# Patient Record
Sex: MALE | Race: BLACK OR AFRICAN AMERICAN | Employment: FULL TIME | URBAN - METROPOLITAN AREA
[De-identification: names, ages, dates, MRNs, and addresses within clinical notes are randomized per-mention and may not be internally consistent; named-entity substitution may affect disease eponyms.]

---

## 2017-01-18 ENCOUNTER — OFFICE VISIT (OUTPATIENT)
Dept: FAMILY MEDICINE CLINIC | Age: 52
End: 2017-01-18

## 2017-01-18 VITALS
OXYGEN SATURATION: 95 % | DIASTOLIC BLOOD PRESSURE: 97 MMHG | HEART RATE: 80 BPM | RESPIRATION RATE: 16 BRPM | HEIGHT: 71 IN | TEMPERATURE: 97.3 F | WEIGHT: 210 LBS | BODY MASS INDEX: 29.4 KG/M2 | SYSTOLIC BLOOD PRESSURE: 155 MMHG

## 2017-01-18 DIAGNOSIS — J40 BRONCHITIS: Primary | ICD-10-CM

## 2017-01-18 RX ORDER — DOXYCYCLINE 100 MG/1
100 CAPSULE ORAL 2 TIMES DAILY
Qty: 20 CAP | Refills: 0 | Status: SHIPPED | OUTPATIENT
Start: 2017-01-18 | End: 2017-01-28

## 2017-01-18 RX ORDER — METOPROLOL TARTRATE 50 MG/1
50 TABLET ORAL 2 TIMES DAILY
Qty: 30 TAB | Refills: 11 | Status: SHIPPED | OUTPATIENT
Start: 2017-01-18 | End: 2018-10-02

## 2017-01-18 RX ORDER — DILTIAZEM HYDROCHLORIDE EXTENDED-RELEASE TABLETS 240 MG/1
240 TABLET, EXTENDED RELEASE ORAL DAILY
Qty: 30 TAB | Refills: 0 | Status: SHIPPED | OUTPATIENT
Start: 2017-01-18 | End: 2018-09-14

## 2017-01-18 RX ORDER — TELMISARTAN 40 MG/1
40 TABLET ORAL DAILY
Qty: 30 TAB | Refills: 11 | Status: SHIPPED | OUTPATIENT
Start: 2017-01-18 | End: 2018-09-14

## 2017-01-18 RX ORDER — CLONIDINE HYDROCHLORIDE 0.2 MG/1
0.2 TABLET ORAL
Qty: 90 TAB | Refills: 0 | Status: SHIPPED | OUTPATIENT
Start: 2017-01-18 | End: 2017-02-15 | Stop reason: SDUPTHER

## 2017-01-18 RX ORDER — CLONIDINE HYDROCHLORIDE 0.2 MG/1
TABLET ORAL
COMMUNITY
End: 2017-01-18 | Stop reason: SDUPTHER

## 2017-01-18 NOTE — MR AVS SNAPSHOT
Visit Information Date & Time Provider Department Dept. Phone Encounter #  
 1/18/2017  4:30 PM Maxine Soto, 3 [facility] [phone] Upcoming Health Maintenance Date Due Hepatitis C Screening 1965 EYE EXAM RETINAL OR DILATED Q1 10/14/1974 FOBT Q 1 YEAR AGE 50-75 10/14/2014 MICROALBUMIN Q1 11/19/2015 HEMOGLOBIN A1C Q6M 4/3/2017 Pneumococcal 19-64 Highest Risk (2 of 3 - PCV13) 10/3/2017 FOOT EXAM Q1 10/3/2017 LIPID PANEL Q1 10/3/2017 DTaP/Tdap/Td series (2 - Td) 10/3/2026 Allergies as of 1/18/2017  Review Complete On: 1/18/2017 By: Deyvi Ro LPN Severity Noted Reaction Type Reactions Lisinopril  10/03/2016    Swelling Pt states any medications ending in \"Pril\" he's allergic to Current Immunizations  Never Reviewed Name Date Influenza Vaccine 10/3/2016 Pneumococcal Polysaccharide (PPSV-23) 10/3/2016 Tdap 10/3/2016 Not reviewed this visit You Were Diagnosed With   
  
 Codes Comments Bronchitis    -  Primary ICD-10-CM: R33 ICD-9-CM: 017 DM (diabetes mellitus), secondary, uncontrolled, with renal complications ([facility]     AKR-87-YH: E13.29, E13.65 ICD-9-CM: 249.41 Vitals BP Pulse Temp Resp Height(growth percentile) Weight(growth percentile) (!) 155/97 (BP 1 Location: Left arm) 80 97.3 °F (36.3 °C) (Oral) 16 5' 11\" (1.803 m) 210 lb (95.3 kg) SpO2 BMI Smoking Status 95% 29.29 kg/m2 Never Smoker BMI and BSA Data Body Mass Index Body Surface Area  
 29.29 kg/m 2 2.18 m 2 Preferred Pharmacy Pharmacy Name Phone [facility] [address] [phone] Your Updated Medication List  
  
   
This list is accurate as of: 1/18/17  4:42 PM.  Always use your most recent med list.  
  
  
  
  
 atorvastatin 40 mg tablet Commonly known as:  LIPITOR Take 1 Tab by mouth daily. cloNIDine HCl 0.2 mg tablet Commonly known as:  CATAPRES Take 1 Tab by mouth Before breakfast, lunch, and dinner. diltiazem hcl 240 mg Tb24 Take 240 mg by mouth daily. doxycycline 100 mg capsule Commonly known as:  VIBRAMYCIN Take 1 Cap by mouth two (2) times a day for 10 days. ergocalciferol 50,000 unit capsule Commonly known as:  ERGOCALCIFEROL Take 1 Cap by mouth every seven (7) days. ketoconazole 2 % topical cream  
Commonly known as:  NIZORAL Apply between and under toes of both feet twice daily until rash resolves * LANTUS 100 unit/mL injection Generic drug:  insulin glargine  
by SubCUTAneous route nightly. Indications: taking 50 units q day * LANTUS SOLOSTAR 100 unit/mL (3 mL) pen Generic drug:  insulin glargine  
by SubCUTAneous route. metoprolol tartrate 50 mg tablet Commonly known as:  LOPRESSOR Take 1 Tab by mouth two (2) times a day. NovoLOG Flexpen 100 unit/mL Inpn Generic drug:  insulin aspart  
by SubCUTAneous route. Indications: 8 units before each meal  
  
 telmisartan 40 mg tablet Commonly known as:  Brent Bishop Take 1 Tab by mouth daily. * Notice: This list has 2 medication(s) that are the same as other medications prescribed for you. Read the directions carefully, and ask your doctor or other care provider to review them with you. Prescriptions Sent to Pharmacy Refills  
 cloNIDine HCl (CATAPRES) 0.2 mg tablet 0 Sig: Take 1 Tab by mouth Before breakfast, lunch, and dinner. Class: Normal  
 Pharmacy: [facility] [place] [address] [address]  Ph #: [phone] Route: Oral  
 diltiazem hcl 240 mg Tb24 0 Sig: Take 240 mg by mouth daily. Class: Normal  
 [facility] [place] [address] [facility] [phone]  Route: Oral  
 telmisartan (MICARDIS) 40 mg tablet 11 Sig: Take 1 Tab by mouth daily. Class: Normal  
 Pharmacy: [facility] [place] [address] [address]  #: [phone] Route: Oral  
 metoprolol tartrate (LOPRESSOR) 50 mg tablet 11 Sig: Take 1 Tab by mouth two (2) times a day. Class: Normal  
 Pharmacy: [facility] [place] [address] [address]  Ph #: [phone] Route: Oral  
 doxycycline (VIBRAMYCIN) 100 mg capsule 0 Sig: Take 1 Cap by mouth two (2) times a day for 10 days. Class: Normal  
 Pharmacy: [facility] [place] [address] [address] Ph #: [phone] Route: Oral  
  
Patient Instructions Present to the Emergency Department for immediate evaluation after clinic discharge. Introducing [facility]! Marleni Mccormack introduces [employer] patient portal. Now you can access parts of your medical record, email your doctor's office, and request medication refills online. 1. In your internet browser, go to [email or web address] 2. Click on the First Time User? Click Here link in the Sign In box. You will see the New Member Sign Up page. 3. Enter your [employer] Access Code exactly as it appears below. You will not need to use this code after youve completed the sign-up process. If you do not sign up before the expiration date, you must request a new code. · [employer] Access Code: [email or web address] Expires: 4/18/2017  4:42 PM 
 
4. Enter the last four digits of your Social Security Number (xxxx) and Date of Birth (mm/dd/yyyy) as indicated and click Submit. You will be taken to the next sign-up page. 5. Create a [employer] ID. This will be your [employer] login ID and cannot be changed, so think of one that is secure and easy to remember. 6. Create a [employer] password. You can change your password at any time. 7. Enter your Password Reset Question and Answer. This can be used at a later time if you forget your password. 8. Enter your e-mail address. You will receive e-mail notification when new information is available in [address] 9. Click Sign Up. You can now view and download portions of your medical record. 10. Click the Download Summary menu link to download a portable copy of your medical information. If you have questions, please visit the Frequently Asked Questions section of the [employer] website. Remember, [employer] is NOT to be used for urgent needs. For medical emergencies, dial 911. Now available from your iPhone and Android! Please provide this summary of care documentation to your next provider. Your primary care clinician is listed as Tabatha Newton. If you have any questions after today's visit, please call [phone].

## 2017-01-18 NOTE — PROGRESS NOTES
HISTORY OF PRESENT ILLNESS  Magdalena Lui is a 45 y.o. male. HPI Patient reports a tight, wheezy cough the past week associated with pleuritic chest pain, generalized malaise, increased thirst, sinus congestion. He is type I DM and is without all of his blood pressure medication except one. Blood sugars have been 500+ the past two weeks. He thinks he may have \"passed out\" last week related to these symptoms. No treatment to date. Symptom onset gradual, timing constant, pain moderate. Past Medical History   Diagnosis Date    Diabetes ([facility] [address] [facility]      type 1  with chronic kidney disease    Hypertension     Kidney disease        Current Outpatient Prescriptions:     cloNIDine HCl (CATAPRES) 0.2 mg tablet, Take 1 Tab by mouth Before breakfast, lunch, and dinner., Disp: 90 Tab, Rfl: 0    diltiazem hcl 240 mg Tb24, Take 240 mg by mouth daily. , Disp: 30 Tab, Rfl: 0    telmisartan (MICARDIS) 40 mg tablet, Take 1 Tab by mouth daily. , Disp: 30 Tab, Rfl: 11    metoprolol tartrate (LOPRESSOR) 50 mg tablet, Take 1 Tab by mouth two (2) times a day., Disp: 30 Tab, Rfl: 11    doxycycline (VIBRAMYCIN) 100 mg capsule, Take 1 Cap by mouth two (2) times a day for 10 days. , Disp: 20 Cap, Rfl: 0    atorvastatin (LIPITOR) 40 mg tablet, Take 1 Tab by mouth daily. , Disp: 90 Tab, Rfl: 0    ergocalciferol (ERGOCALCIFEROL) 50,000 unit capsule, Take 1 Cap by mouth every seven (7) days. , Disp: 12 Cap, Rfl: 3    insulin glargine (LANTUS) 100 unit/mL injection, by SubCUTAneous route nightly. Indications: taking 50 units q day, Disp: , Rfl:     insulin aspart (NOVOLOG FLEXPEN) 100 unit/mL inpn, by SubCUTAneous route.  Indications: 8 units before each meal, Disp: , Rfl:     insulin glargine (LANTUS SOLOSTAR) 100 unit/mL (3 mL) pen, by SubCUTAneous route., Disp: , Rfl:     ketoconazole (NIZORAL) 2 % topical cream, Apply between and under toes of both feet twice daily until rash resolves, Disp: 30 g, Rfl: 1    Review of Systems Constitutional: Positive for malaise/fatigue. Negative for chills, diaphoresis, fever and weight loss. HENT: Positive for congestion. Negative for ear pain and sore throat. Eyes: Negative. Negative for blurred vision, pain, discharge and redness. Respiratory: Positive for cough, shortness of breath and wheezing. Negative for hemoptysis, sputum production and stridor. Cardiovascular: Positive for chest pain. Negative for palpitations, orthopnea, claudication, leg swelling and PND. Gastrointestinal: Negative. Negative for abdominal pain, nausea and vomiting. Musculoskeletal: Positive for myalgias. Negative for back pain, falls, joint pain and neck pain. Skin: Negative. Negative for itching and rash. Neurological: Negative. Negative for dizziness, tingling, sensory change, speech change, focal weakness, seizures and weakness. Physical Exam   Constitutional: He is oriented to person, place, and time. He appears well-developed and well-nourished. No distress. HENT:   Head: Atraumatic. Right Ear: External ear normal.   Left Ear: External ear normal.   Mucous membranes are dry. Eyes: EOM are normal. Pupils are equal, round, and reactive to light. Right eye exhibits no discharge. Left eye exhibits no discharge. No scleral icterus. Neck: Neck supple. No JVD present. No tracheal deviation present. Raspy voice. Cardiovascular: Normal rate, regular rhythm, normal heart sounds and intact distal pulses. Exam reveals no gallop and no friction rub. No murmur heard. Pulmonary/Chest: Effort normal. No stridor. No respiratory distress. He has no wheezes. He has no rales. Breath sounds are tight. No accessory muscle use noted. Abdominal: Soft. He exhibits no distension. There is no tenderness. There is no rebound. Musculoskeletal: Normal range of motion. He exhibits no edema, tenderness or deformity. Lymphadenopathy:     He has no cervical adenopathy.    Neurological: He is alert and oriented to person, place, and time. No cranial nerve deficit. He exhibits normal muscle tone. Coordination normal.   Skin: Skin is warm and dry. No rash noted. He is not diaphoretic. No erythema. Psychiatric: He has a normal mood and affect. His behavior is normal. Judgment and thought content normal.   Nursing note and vitals reviewed. POC glucose; \" high\"    ASSESSMENT and PLAN  1: Bronchitis:   -Doxycycline 100 mg bid x 10 days   -Prednisone not prescribed give blood sugar results. 2. DMT1:    -Present to the emergency department immediately after this clinic visit. -Pt was stable at the time of discharge; alert and oriented x3.  3. Encounter for medication refill:   -Hypertensive medications refilled. Patient is in agreement with the treatment plan. Return to the clinic if needed. All questions answered and discharge instructions reviewed with the patient.

## 2017-02-15 DIAGNOSIS — I10 UNCONTROLLED HYPERTENSION: Primary | ICD-10-CM

## 2017-02-15 RX ORDER — CLONIDINE HYDROCHLORIDE 0.2 MG/1
0.2 TABLET ORAL
Qty: 90 TAB | Refills: 0 | Status: SHIPPED | OUTPATIENT
Start: 2017-02-15 | End: 2017-03-31 | Stop reason: SDUPTHER

## 2017-03-24 RX ORDER — CLONIDINE HYDROCHLORIDE 0.2 MG/1
0.2 TABLET ORAL
Qty: 90 TAB | Refills: 0 | Status: CANCELLED | OUTPATIENT
Start: 2017-03-24

## 2017-03-24 NOTE — TELEPHONE ENCOUNTER
Pt is requesting to have refills added to the medication if possible. He also states he is also out of medication.

## 2017-04-03 RX ORDER — CLONIDINE HYDROCHLORIDE 0.2 MG/1
TABLET ORAL
Qty: 90 TAB | Refills: 0 | Status: SHIPPED | OUTPATIENT
Start: 2017-04-03 | End: 2017-05-08 | Stop reason: SDUPTHER

## 2017-07-24 DIAGNOSIS — E78.00 HYPERCHOLESTEREMIA: ICD-10-CM

## 2017-07-24 DIAGNOSIS — E55.9 VITAMIN D DEFICIENCY: ICD-10-CM

## 2017-07-24 RX ORDER — ATORVASTATIN CALCIUM 40 MG/1
40 TABLET, FILM COATED ORAL DAILY
Qty: 30 TAB | Refills: 0 | Status: SHIPPED | OUTPATIENT
Start: 2017-07-24 | End: 2017-09-13 | Stop reason: SDUPTHER

## 2017-07-24 RX ORDER — ERGOCALCIFEROL 1.25 MG/1
50000 CAPSULE ORAL
Qty: 12 CAP | Refills: 3 | Status: SHIPPED | OUTPATIENT
Start: 2017-07-24 | End: 2019-04-25

## 2017-07-24 RX ORDER — CLONIDINE HYDROCHLORIDE 0.2 MG/1
TABLET ORAL
Qty: 90 TAB | Refills: 0 | Status: SHIPPED | OUTPATIENT
Start: 2017-07-24 | End: 2017-08-16 | Stop reason: SDUPTHER

## 2017-07-24 NOTE — TELEPHONE ENCOUNTER
Pt stated he will be out of meds prior to next available(on Friday)  Stating he is needing the insulins(not sure which one it is)  His test strips, and his blood pressure medications(only able to name a couple, please double check list)

## 2017-08-17 RX ORDER — CLONIDINE HYDROCHLORIDE 0.2 MG/1
TABLET ORAL
Qty: 90 TAB | Refills: 0 | Status: SHIPPED | OUTPATIENT
Start: 2017-08-17 | End: 2017-09-13 | Stop reason: SDUPTHER

## 2018-09-11 PROBLEM — R73.9 HYPERGLYCEMIA: Status: ACTIVE | Noted: 2018-09-11

## 2018-09-14 PROBLEM — I11.9 HYPERTENSIVE HEART DISEASE WITHOUT CHF: Status: ACTIVE | Noted: 2018-09-14

## 2018-09-30 PROBLEM — T50.905A MEDICATION REACTION, INITIAL ENCOUNTER: Status: ACTIVE | Noted: 2018-09-30

## 2018-09-30 PROBLEM — R00.0 TACHYCARDIA: Status: ACTIVE | Noted: 2018-09-30

## 2018-09-30 PROBLEM — I16.1 HYPERTENSIVE EMERGENCY: Status: ACTIVE | Noted: 2018-09-30

## 2018-09-30 PROBLEM — F41.9 ANXIETY: Status: ACTIVE | Noted: 2018-09-30

## 2018-09-30 PROBLEM — R77.8 ELEVATED TROPONIN: Status: ACTIVE | Noted: 2018-09-30

## 2019-04-25 ENCOUNTER — TELEPHONE (OUTPATIENT)
Dept: FAMILY MEDICINE CLINIC | Age: 54
End: 2019-04-25

## 2019-04-25 ENCOUNTER — OFFICE VISIT (OUTPATIENT)
Dept: FAMILY MEDICINE CLINIC | Age: 54
End: 2019-04-25

## 2019-04-25 VITALS
RESPIRATION RATE: 18 BRPM | WEIGHT: 204 LBS | HEIGHT: 71 IN | TEMPERATURE: 95.9 F | OXYGEN SATURATION: 99 % | SYSTOLIC BLOOD PRESSURE: 146 MMHG | HEART RATE: 66 BPM | BODY MASS INDEX: 28.56 KG/M2 | DIASTOLIC BLOOD PRESSURE: 90 MMHG

## 2019-04-25 DIAGNOSIS — Z87.39 HISTORY OF GOUT: ICD-10-CM

## 2019-04-25 DIAGNOSIS — Z00.00 ROUTINE GENERAL MEDICAL EXAMINATION AT A HEALTH CARE FACILITY: ICD-10-CM

## 2019-04-25 DIAGNOSIS — E55.9 VITAMIN D DEFICIENCY: ICD-10-CM

## 2019-04-25 DIAGNOSIS — Z00.00 ROUTINE GENERAL MEDICAL EXAMINATION AT A HEALTH CARE FACILITY: Primary | ICD-10-CM

## 2019-04-25 DIAGNOSIS — E78.00 HYPERCHOLESTEREMIA: ICD-10-CM

## 2019-04-25 DIAGNOSIS — Z12.11 COLON CANCER SCREENING: ICD-10-CM

## 2019-04-25 DIAGNOSIS — N18.30 STAGE 3 CHRONIC KIDNEY DISEASE (HCC): ICD-10-CM

## 2019-04-25 DIAGNOSIS — K21.9 GASTROESOPHAGEAL REFLUX DISEASE WITHOUT ESOPHAGITIS: ICD-10-CM

## 2019-04-25 DIAGNOSIS — I11.9 HYPERTENSIVE HEART DISEASE WITHOUT CHF: ICD-10-CM

## 2019-04-25 DIAGNOSIS — I10 ESSENTIAL HYPERTENSION: ICD-10-CM

## 2019-04-25 LAB — HBA1C MFR BLD HPLC: 10.2 %

## 2019-04-25 RX ORDER — RANITIDINE 150 MG/1
150 TABLET, FILM COATED ORAL 2 TIMES DAILY
Qty: 180 TAB | Refills: 3 | Status: SHIPPED | OUTPATIENT
Start: 2019-04-25

## 2019-04-25 RX ORDER — OXYCODONE AND ACETAMINOPHEN 10; 325 MG/1; MG/1
TABLET ORAL
COMMUNITY
End: 2019-07-18

## 2019-04-25 RX ORDER — METOPROLOL TARTRATE 50 MG/1
TABLET ORAL 2 TIMES DAILY
COMMUNITY

## 2019-04-25 RX ORDER — AMLODIPINE BESYLATE 10 MG/1
TABLET ORAL DAILY
COMMUNITY

## 2019-04-25 RX ORDER — CLONIDINE HYDROCHLORIDE 0.2 MG/1
TABLET ORAL 2 TIMES DAILY
COMMUNITY

## 2019-04-25 RX ORDER — TELMISARTAN 20 MG/1
TABLET ORAL DAILY
COMMUNITY

## 2019-04-25 NOTE — TELEPHONE ENCOUNTER
Pt said he is out of test strips. He said they are called one touch test strips but I did not see them in his medication list. He said he is completely out.

## 2019-04-25 NOTE — PROGRESS NOTES
Sandy Carias is a 46 y.o. male (: 1965) presenting to address: Chief Complaint Patient presents with  Diabetes Vitals:  
 19 1049 BP: (!) 159/95 Pulse: 66 Resp: 18 Temp: 95.9 °F (35.5 °C) TempSrc: Oral  
SpO2: 99% Weight: 204 lb (92.5 kg) Height: 5' 11\" (1.803 m) PainSc:   0 - No pain Hearing/Vision: No exam data present Learning Assessment:  
 
Learning Assessment 10/3/2016 PRIMARY LEARNER Patient HIGHEST LEVEL OF EDUCATION - PRIMARY LEARNER  2 YEARS OF COLLEGE  
BARRIERS PRIMARY LEARNER NONE  
CO-LEARNER CAREGIVER No  
PRIMARY LANGUAGE ENGLISH  
LEARNER PREFERENCE PRIMARY DEMONSTRATION  
ANSWERED BY patient RELATIONSHIP SELF Depression Screening:  
 
3 most recent PHQ Screens 2017 Little interest or pleasure in doing things Not at all Feeling down, depressed, irritable, or hopeless Not at all Total Score PHQ 2 0 Fall Risk Assessment:  
No flowsheet data found. Abuse Screening: No flowsheet data found. Coordination of Care Questionaire: 1. Have you been to the ER, urgent care clinic since your last visit? Hospitalized since your last visit? NO 
 
2. Have you seen or consulted any other health care providers outside of the [address] [place] [address] since your last visit? Include any pap smears or colon screening. YES [place] Advanced Directive: 1. Do you have an Advanced Directive? NO 
 
2. Would you like information on Advanced Directives?  NO

## 2019-04-25 NOTE — PROGRESS NOTES
HISTORY OF PRESENT ILLNESS Carolynn Arcos is a 46 y.o. male. Patient returns to reestablish care has not been seen here since 10/2016 Follow Up Chronic Condition The history is provided by the patient and medical records. This is a chronic problem. Mr#: [id number] Past Medical History:  
Diagnosis Date  Diabetes ([facility] [address] type 1  with chronic kidney disease  Hypertension  Kidney disease No past surgical history on file. Family History Problem Relation Age of Onset  Hypertension Mother  Diabetes Father  Heart Failure Father  Cancer Neg Hx   
 Heart Disease Neg Hx Allergies Allergen Reactions  Lisinopril Swelling Pt states any medications ending in \"Pril\" he's allergic to Social History Tobacco Use Smoking Status Never Smoker Social History Substance and Sexual Activity Alcohol Use No  
 
 
Health Maintenance Review: 
Immunization History Administered Date(s) Administered  Influenza Vaccine (Quad) PF 10/03/2016, 10/02/2018  Pneumococcal Conjugate (PCV-13) 09/14/2018  Pneumococcal Polysaccharide (PPSV-23) 10/03/2016  Tdap 10/03/2016 Due for diabetic eye exam 
Colonoscopy: due ? PSA -  
 
 
 
Patient Active Problem List  
Diagnosis Code  Uncontrolled type 1 diabetes mellitus with stage 3 chronic kidney disease (HCC) E10.22, N18.3, E10.65  Gastroesophageal reflux disease without esophagitis K21.9  
 Uncontrolled hypertension I10  Chronic kidney disease (CKD) N18.9  Hypercholesteremia E78.00  Vitamin D deficiency E55.9  Hyperglycemia R73.9  Hypertensive heart disease without CHF I11.9  Anxiety F41.9  Tachycardia R00.0  Elevated troponin R74.8  Hypertensive emergency I16.1  Medication reaction, initial encounter T50.905A Current Outpatient Medications:  
  aspirin 81 mg chewable tablet, Take 1 Tab by mouth daily. , Disp: 30 Tab, Rfl: 0 
   gabapentin (NEURONTIN) 100 mg capsule, Take 1 Cap by mouth three (3) times daily. , Disp: 90 Cap, Rfl: 0 
  insulin NPH/insulin regular (NOVOLIN 70/30 U-100 INSULIN) 100 unit/mL (70-30) injection, 25 Units by SubCUTAneous route two (2) times a day., Disp: 10 mL, Rfl: 1   insulin aspart U-100 (NOVOLOG) 100 unit/mL inpn, Blood sugar     Insulin units 125-150         2 151-200         4 201-250         6 251-300         8 301-350        10  351-400         12 (Patient taking differently: Blood sugar     Insulin units 125-150         2 151-200         4 201-250         6 251-300         8 301-350        10  351-400         12  Indications: Per pt. he has not been using med on the regular due to his sugar being low.), Disp: 1 Pen, Rfl: 1   Blood-Glucose Meter monitoring kit, Check your blood sugar before meals and at bedtime, Disp: 1 Kit, Rfl: 0 
  atorvastatin (LIPITOR) 40 mg tablet, TAKE 1 TABLET BY MOUTH DAILY (Patient not taking: Reported on 9/30/2018), Disp: 30 Tab, Rfl: 0 
  ergocalciferol (ERGOCALCIFEROL) 50,000 unit capsule, Take 1 Cap by mouth every seven (7) days. , Disp: 12 Cap, Rfl: 3 
  ketoconazole (NIZORAL) 2 % topical cream, Apply between and under toes of both feet twice daily until rash resolves, Disp: 30 g, Rfl: 1 Reports that he is taking insulin glargine 50 units subcu daily and insulin lispro 10 units 3 times daily with meals Reports taking metoprolol once daily and clonidine 3 times daily Review of Systems Constitutional: Negative for chills, fever and weight loss. HENT: Negative for hearing loss. Genitourinary: Negative for dysuria and urgency. Musculoskeletal: Negative for joint pain and myalgias. Skin: Negative for itching and rash. Endo/Heme/Allergies: Negative for environmental allergies. Psychiatric/Behavioral: Negative for depression. The patient is not nervous/anxious and does not have insomnia. Visit Vitals /90 Pulse 66  
 Temp 95.9 °F (35.5 °C) (Oral) Resp 18 Ht 5' 11\" (1.803 m) Wt 204 lb (92.5 kg) SpO2 99% BMI 28.45 kg/m² Physical Exam  
Constitutional: He appears well-developed and well-nourished. HENT:  
Head: Normocephalic. Right Ear: Tympanic membrane and ear canal normal.  
Left Ear: Tympanic membrane and ear canal normal.  
Mouth/Throat: Oropharynx is clear and moist.  
Eyes: Pupils are equal, round, and reactive to light. Conjunctivae and EOM are normal.  
Neck: Neck supple. Abdominal: There is no tenderness. Skin: Skin is warm and dry. Psychiatric: He has a normal mood and affect. His behavior is normal.  
Nursing note and vitals reviewed. Annual CPE/Wellness Encounter Plan: Anticipatory guidance and recommendations provided verbally and with printed information. Return for annual physical exam in 1 year, sooner with any problems. Problems Encounter: HPI -returns for follow-up and assistance with multiple problems including type 1 diabetes and associated symptoms including neuropathy, polydipsia, polyuria, hypertension with chronic kidney disease, acknowledges frequent heartburn ROS: 
Eyes: Positive for blurred vision. Negative for double vision. Respiratory: Positive for shortness of breath (with exertion). Negative for cough and wheezing. Cardiovascular: Positive for leg swelling. Negative for chest pain and palpitations. Reports that he fell asleep last night before taking his nighttime doses of antihypertensive medication Chart review with note of previous hospitalization with hypertensive emergency possibly associated with/resulting from an over-the-counter medication Gastrointestinal: Positive for heartburn (frequent). Negative for abdominal pain, blood in stool, constipation, diarrhea, melena, nausea and vomiting. Genitourinary: Positive for frequency. Negative for dysuria and urgency.   
Neurological: Positive for tingling (burning in feet bilaterally, worse after eating, L>R ). Negative for dizziness, sensory change, focal weakness and headaches. Endo/Heme/Allergies: Positive for polydipsia and polyuria. Negative for environmental allergies. Reports glucometer readings in the 130 range for the past 24 hours, when advised that this seems inconsistent with a hemoglobin A1c greater than 10 patient reports that he has not been following instructions with regard to his diet and medication until yesterday. He has a history of ED visits and hospitalizations because of excessive hyperglycemia He has been followed by endocrinology in the past but reports having lost his insurance coverage which interrupted his care Exam: 
Cardiovascular: Normal rate, regular rhythm, normal heart sounds and intact distal pulses. Blood pressure elevated. Pulmonary/Chest: Effort normal and breath sounds normal.  
Abdominal: Soft. Bowel sounds are normal. There is no tenderness. Musculoskeletal: He exhibits no edema. Neurological: He is alert and oriented to person, place, and time. He has normal reflexes. Diabetic foot exam performed by Blake Daniels MD  
 
Measurement  Response Nurse Comment Physician Comment Monofilament  R - 4/6 L - 4/6 Pulse DP R - 2+ (normal) L - 2+ (normal) Pulse TP R - 2+ (normal) L - 2+ (normal) Structural deformity R - None L - None Skin Integrity / Deformity R - None L - None Results for Iglesia Salguero (MRN [id number]) as of 4/25/2019 13:00 Ref. Range 10/2/2018 07:00 10/2/2018 07:02 10/2/2018 08:38 10/2/2018 12:57 4/25/2019 11:10  
GLUCOSE,FAST - POC Latest Ref Range: 65 - 105 mg/dL 438 (HH) 444 (HH) 484 (HH) 111 (H) Hemoglobin A1c (POC) Latest Units: %     10.2 ASSESSMENT and PLAN 
  ICD-10-CM ICD-9-CM 1. Routine general medical examination at a health care facility Z00.00 V70.0 CBC WITH AUTOMATED DIFF  
   LIPID PANEL  
   METABOLIC PANEL, COMPREHENSIVE    URINALYSIS W/ RFLX MICROSCOPIC  
 TSH 3RD GENERATION  
   VITAMIN D, 25 HYDROXY  
   PSA SCREENING  
   MICROALBUMIN, UR, RAND W/ MICROALB/CREAT RATIO  
2. Uncontrolled type 1 diabetes mellitus with stage 3 chronic kidney disease (HCC) E10.22 250.43 AMB POC HEMOGLOBIN A1C  
 N18.3 585.3 MICROALBUMIN, UR, RAND W/ MICROALB/CREAT RATIO  
 E10.65  REFERRAL TO ENDOCRINOLOGY  
    DIABETES FOOT EXAM  
3. Essential hypertension Q24 345.1 METABOLIC PANEL, COMPREHENSIVE 4. Hypercholesteremia E78.00 272.0 LIPID PANEL 5. Stage 3 chronic kidney disease (HCC) N18.3 585.3 6. Hypertensive heart disease without CHF I11.9 402.90   
7. Gastroesophageal reflux disease without esophagitis K21.9 530.81 raNITIdine (ZANTAC) 150 mg tablet 8. Vitamin D deficiency E55.9 268.9 VITAMIN D, 25 HYDROXY 9. History of gout Z87.39 V12.29 URIC ACID 10. Colon cancer screening Z12.11 V76.51 REFERRAL TO GASTROENTEROLOGY Lab today, further disposition pending lab results if indicated. Continue current medications pending lab results. Begin ranitidine 150 mg twice daily with food, avoid citrus dairy caffeine tomato and do not eat within 2 or 3 hours of bedtime. Schedule diabetic eye exam and asked that results be sent here. Schedule follow-up in 1 month, sooner with any problems. Avoid dietary starch and sugar and follow a program of regular aerobic exercise. Because of chronic kidney disease, avoid salt, monitor blood pressure carefully and report inability to maintain control at less than 135/80. Do not take nephrotoxic substances such as nonsteroidal anti-inflammatory medications which include ibuprofen, Motrin, Advil, naproxen, Aleve and any other prescription arthritis medications. Tylenol is not harmful.

## 2019-04-25 NOTE — PATIENT INSTRUCTIONS
Lab today, further disposition pending lab results if indicated. Continue current medications pending lab results. Begin ranitidine 150 mg twice daily with food, avoid citrus dairy caffeine tomato and do not eat within 2 or 3 hours of bedtime. Schedule diabetic eye exam and asked that results be sent here. Schedule follow-up in 1 month, sooner with any problems. Avoid dietary starch and sugar and follow a program of regular aerobic exercise. Because of chronic kidney disease, avoid salt, monitor blood pressure carefully and report inability to maintain control at less than 135/80. Do not take nephrotoxic substances such as nonsteroidal anti-inflammatory medications which include ibuprofen, Motrin, Advil, naproxen, Aleve and any other prescription arthritis medications. Tylenol is not harmful. Well Visit, Men 48 to 72: Care Instructions Your Care Instructions Physical exams can help you stay healthy. Your doctor has checked your overall health and may have suggested ways to take good care of yourself. He or she also may have recommended tests. At home, you can help prevent illness with healthy eating, regular exercise, and other steps. Follow-up care is a key part of your treatment and safety. Be sure to make and go to all appointments, and call your doctor if you are having problems. It's also a good idea to know your test results and keep a list of the medicines you take. How can you care for yourself at home? · Reach and stay at a healthy weight. This will lower your risk for many problems, such as obesity, diabetes, heart disease, and high blood pressure. · Get at least 30 minutes of exercise on most days of the week. Walking is a good choice. You also may want to do other activities, such as running, swimming, cycling, or playing tennis or team sports. · Do not smoke. Smoking can make health problems worse.  If you need help quitting, talk to your doctor about stop-smoking programs and medicines. These can increase your chances of quitting for good. · Protect your skin from too much sun. When you're outdoors from 10 a.m. to 4 p.m., stay in the shade or cover up with clothing and a hat with a wide brim. Wear sunglasses that block UV rays. Even when it's cloudy, put broad-spectrum sunscreen (SPF 30 or higher) on any exposed skin. · See a dentist one or two times a year for checkups and to have your teeth cleaned. · Wear a seat belt in the car. · Limit alcohol to 2 drinks a day. Too much alcohol can cause health problems. Follow your doctor's advice about when to have certain tests. These tests can spot problems early. · Cholesterol. Your doctor will tell you how often to have this done based on your overall health and other things that can increase your risk for heart attack and stroke. · Blood pressure. Have your blood pressure checked during a routine doctor visit. Your doctor will tell you how often to check your blood pressure based on your age, your blood pressure results, and other factors. · Prostate exam. Talk to your doctor about whether you should have a blood test (called a PSA test) for prostate cancer. Experts disagree on whether men should have this test. Some experts recommend that you discuss the benefits and risks of the test with your doctor. · Diabetes. Ask your doctor whether you should have tests for diabetes. · Vision. Some experts recommend that you have yearly exams for glaucoma and other age-related eye problems starting at age 48. · Hearing. Tell your doctor if you notice any change in your hearing. You can have tests to find out how well you hear. · Colon cancer. You should begin tests for colon cancer at age 48. You may have one of several tests. Your doctor will tell you how often to have tests based on your age and risk.  Risks include whether you already had a precancerous polyp removed from your colon or whether your parent, brother, sister, or child has had colon cancer. · Heart attack and stroke risk. At least every 4 to 6 years, you should have your risk for heart attack and stroke assessed. Your doctor uses factors such as your age, blood pressure, cholesterol, and whether you smoke or have diabetes to show what your risk for a heart attack or stroke is over the next 10 years. · Abdominal aortic aneurysm. Ask your doctor whether you should have a test to check for an aneurysm. You may need a test if you ever smoked or if your parent, brother, sister, or child has had an aneurysm. When should you call for help? Watch closely for changes in your health, and be sure to contact your doctor if you have any problems or symptoms that concern you. Where can you learn more? Go to http://philippe-abigail.info/. Enter Q566 in the search box to learn more about \"Well Visit, Men 48 to 72: Care Instructions. \" Current as of: March 28, 2018 Content Version: 11.9 © 2296-2668 [employer]. Care instructions adapted under license by [employer] (which disclaims liability or warranty for this information). If you have questions about a medical condition or this instruction, always ask your healthcare professional. [place] [address] any warranty or liability for your use of this information. Gastroesophageal Reflux Disease (GERD): Care Instructions Your Care Instructions Gastroesophageal reflux disease (GERD) is the backward flow of stomach acid into the esophagus. The esophagus is the tube that leads from your throat to your stomach. A one-way valve prevents the stomach acid from moving up into this tube. When you have GERD, this valve does not close tightly enough. If you have mild GERD symptoms including heartburn, you may be able to control the problem with antacids or over-the-counter medicine.  Changing your diet, losing weight, and making other lifestyle changes can also help reduce symptoms. Follow-up care is a key part of your treatment and safety. Be sure to make and go to all appointments, and call your doctor if you are having problems. It's also a good idea to know your test results and keep a list of the medicines you take. How can you care for yourself at home? · Take your medicines exactly as prescribed. Call your doctor if you think you are having a problem with your medicine. · Your doctor may recommend over-the-counter medicine. For mild or occasional indigestion, antacids, such as Tums, Gaviscon, Mylanta, or Maalox, may help. Your doctor also may recommend over-the-counter acid reducers, such as Pepcid AC, Tagamet HB, Zantac 75, or Prilosec. Read and follow all instructions on the label. If you use these medicines often, talk with your doctor. · Change your eating habits. ? It's best to eat several small meals instead of two or three large meals. ? After you eat, wait 2 to 3 hours before you lie down. ? Chocolate, mint, and alcohol can make GERD worse. ? Spicy foods, foods that have a lot of acid (like tomatoes and oranges), and coffee can make GERD symptoms worse in some people. If your symptoms are worse after you eat a certain food, you may want to stop eating that food to see if your symptoms get better. · Do not smoke or chew tobacco. Smoking can make GERD worse. If you need help quitting, talk to your doctor about stop-smoking programs and medicines. These can increase your chances of quitting for good. · If you have GERD symptoms at night, raise the head of your bed 6 to 8 inches by putting the frame on blocks or placing a foam wedge under the head of your mattress. (Adding extra pillows does not work.) · Do not wear tight clothing around your middle. · Lose weight if you need to. Losing just 5 to 10 pounds can help. When should you call for help? Call your doctor now or seek immediate medical care if: 
  · You have new or different belly pain.  
  · Your stools are black and tarlike or have streaks of blood.  
 Watch closely for changes in your health, and be sure to contact your doctor if: 
  · Your symptoms have not improved after 2 days.  
  · Food seems to catch in your throat or chest.  
Where can you learn more? Go to http://philippe-abigail.info/. Enter S189 in the search box to learn more about \"Gastroesophageal Reflux Disease (GERD): Care Instructions. \" Current as of: March 27, 2018 Content Version: 11.9 © 8507-9956 [employer]. Care instructions adapted under license by [employer] (which disclaims liability or warranty for this information). If you have questions about a medical condition or this instruction, always ask your healthcare professional. [address] any warranty or liability for your use of this information. Kidney Disease and High Blood Pressure: Care Instructions Your Care Instructions Long-term (chronic) kidney disease happens when the kidneys cannot remove waste and keep your body's fluids and chemicals in balance. Usually, the kidneys remove waste from the blood through the urine. When the kidneys are not working well, waste can build up so much that it poisons the body. Kidney disease can make you very tired. It also can cause swelling, or edema, in your legs or other areas of your body. High blood pressure is one of the major causes of chronic kidney disease. And kidney disease can also cause high blood pressure. No matter which came first, having high blood pressure damages the tiny blood vessels in the kidneys. If you have high blood pressure, it is important to lower it. There are many things you can do to lower your blood pressure, which may help slow or stop the damage to your kidneys. Follow-up care is a key part of your treatment and safety. Be sure to make and go to all appointments, and call your doctor if you are having problems. It's also a good idea to know your test results and keep a list of the medicines you take. How can you care for yourself at home? · Be safe with medicines. Take your medicines exactly as prescribed. Call your doctor if you have any problems with your medicine. You will probably need more than one medicine to lower your blood pressure. You will get more details on the specific medicines your doctor prescribes. · Work with your doctor and a dietitian to plan meals that have the right amount of nutrients for you. You will probably have to limit salt, fluids, and protein. · Stay at a healthy weight. This is very important if you put on weight around the waist. Losing even 10 pounds can help you lower your blood pressure. · Manage other health problems such as diabetes and high cholesterol. You can help lower your risk for heart disease and blood vessel problems with a healthy lifestyle along with medicines. · Do not take ibuprofen (Advil, Motrin) or naproxen (Aleve), or similar medicines, unless your doctor tells you to. They may make chronic kidney disease worse. It is okay to take acetaminophen (Tylenol). · If your doctor recommends it, get more exercise. Walking is a good choice. Bit by bit, increase the amount you walk every day. Try for at least 30 minutes on most days of the week. You also may want to swim, bike, or do other activities. · Limit or avoid alcohol. Talk to your doctor about whether you can drink any alcohol. · Do not smoke or allow others to smoke around you. If you need help quitting, talk to your doctor about stop-smoking programs and medicines. These can increase your chances of quitting for good. When should you call for help? Call 911 anytime you think you may need emergency care. For example, call if:   · You passed out (lost consciousness).  
 Call your doctor now or seek immediate medical care if: 
  · You have new or worse nausea and vomiting.  
  · You have much less urine than normal, or you have no urine.  
  · You are feeling confused or cannot think clearly.  
  · You have new or more blood in your urine.  
  · You have new swelling.  
  · You are dizzy or lightheaded, or you feel like you may faint.  
 Watch closely for changes in your health, and be sure to contact your doctor if: 
  · You do not get better as expected. Where can you learn more? Go to http://philippe-abigail.info/. Enter X691 in the search box to learn more about \"Kidney Disease and High Blood Pressure: Care Instructions. \" Current as of: March 14, 2018 Content Version: 11.9 © 2079-2519 [email or web address]. Care instructions adapted under license by [employer] (which disclaims liability or warranty for this information). If you have questions about a medical condition or this instruction, always ask your healthcare professional. [address] any warranty or liability for your use of this information. Purine-Restricted Diet: Care Instructions Your Care Instructions Purines are substances that are found in some foods. Your body turns purines into uric acid. High levels of uric acid can cause gout, which is a form of arthritis that causes pain and inflammation in joints. You may be able to help control the amount of uric acid in your body by limiting high-purine foods in your diet. Follow-up care is a key part of your treatment and safety. Be sure to make and go to all appointments, and call your doctor if you are having problems. It's also a good idea to know your test results and keep a list of the medicines you take. How can you care for yourself at home?  
· Plan your meals and snacks around foods that are low in purines and are safe for you to eat. These foods include: ? Green vegetables and tomatoes. ? Fruits. ? Whole-grain breads, rice, and cereals. ? Eggs, peanut butter, and nuts. ? Low-fat milk, cheese, and other milk products. ? Popcorn. ? Gelatin desserts, chocolate, cocoa, and cakes and sweets, in small amounts. · You can eat certain foods that are medium-high in purines, but eat them only once in a while. These foods include: 
? Legumes, such as dried beans and dried peas. You can have 1 cup cooked legumes each day. ? Asparagus, cauliflower, spinach, mushrooms, and green peas. ? Fish and seafood (other than very high-purine seafood). ? Oatmeal, wheat bran, and wheat germ. · Limit very high-purine foods, including: 
? Organ meats, such as liver, kidneys, sweetbreads, and brains. ? Meats, including haynes, beef, pork, and lamb. ? Game meats and any other meats in large amounts. ? Anchovies, sardines, herring, mackerel, and scallops. ? Gravy. ? Beer. Where can you learn more? Go to http://philippe-abigail.info/. Enter F448 in the search box to learn more about \"Purine-Restricted Diet: Care Instructions. \" Current as of: March 28, 2018 Content Version: 11.9 © 4243-4615 [employer]. Care instructions adapted under license by [employer] (which disclaims liability or warranty for this information). If you have questions about a medical condition or this instruction, always ask your healthcare professional. [place] [address] any warranty or liability for your use of this information.

## 2019-05-13 RX ORDER — BLOOD-GLUCOSE METER
EACH MISCELLANEOUS
Qty: 1 EACH | Refills: 0 | Status: SHIPPED | OUTPATIENT
Start: 2019-05-13

## 2019-05-13 NOTE — TELEPHONE ENCOUNTER
Requested prescriptions sent to the patient's pharmacy. He has been referred to an endocrinologist and should be seeing the endocrinologist who should be taking over these prescriptions. Please check with him about that.

## 2019-05-13 NOTE — TELEPHONE ENCOUNTER
Pt called back stating that he needs the one touch zero flex meter w/ 100 test strips to [facility] [address]

## 2022-01-25 NOTE — PATIENT INSTRUCTIONS
Parking placard filled out and sent to patient via portal    Present to the Emergency Department for immediate evaluation after clinic discharge.

## 2022-03-18 PROBLEM — R77.8 ELEVATED TROPONIN: Status: ACTIVE | Noted: 2018-09-30

## 2022-03-18 PROBLEM — I11.9 HYPERTENSIVE HEART DISEASE WITHOUT CHF: Status: ACTIVE | Noted: 2018-09-14

## 2022-03-19 PROBLEM — R73.9 HYPERGLYCEMIA: Status: ACTIVE | Noted: 2018-09-11

## 2022-03-19 PROBLEM — I16.1 HYPERTENSIVE EMERGENCY: Status: ACTIVE | Noted: 2018-09-30

## 2022-03-19 PROBLEM — F41.9 ANXIETY: Status: ACTIVE | Noted: 2018-09-30

## 2022-03-19 PROBLEM — R00.0 TACHYCARDIA: Status: ACTIVE | Noted: 2018-09-30

## 2022-03-20 PROBLEM — T50.905A MEDICATION REACTION, INITIAL ENCOUNTER: Status: ACTIVE | Noted: 2018-09-30
